# Patient Record
Sex: FEMALE | Race: WHITE | NOT HISPANIC OR LATINO | Employment: OTHER | URBAN - METROPOLITAN AREA
[De-identification: names, ages, dates, MRNs, and addresses within clinical notes are randomized per-mention and may not be internally consistent; named-entity substitution may affect disease eponyms.]

---

## 2024-10-09 ENCOUNTER — APPOINTMENT (OUTPATIENT)
Dept: RADIOLOGY | Facility: HOSPITAL | Age: 89
End: 2024-10-09
Payer: MEDICARE

## 2024-10-09 ENCOUNTER — APPOINTMENT (OUTPATIENT)
Dept: CARDIOLOGY | Facility: HOSPITAL | Age: 89
End: 2024-10-09
Payer: MEDICARE

## 2024-10-09 ENCOUNTER — HOSPITAL ENCOUNTER (INPATIENT)
Facility: HOSPITAL | Age: 89
LOS: 1 days | Discharge: HOSPICE | End: 2024-10-09
Attending: EMERGENCY MEDICINE | Admitting: STUDENT IN AN ORGANIZED HEALTH CARE EDUCATION/TRAINING PROGRAM
Payer: MEDICARE

## 2024-10-09 VITALS
DIASTOLIC BLOOD PRESSURE: 89 MMHG | TEMPERATURE: 97.8 F | SYSTOLIC BLOOD PRESSURE: 134 MMHG | RESPIRATION RATE: 21 BRPM | OXYGEN SATURATION: 94 % | HEART RATE: 109 BPM

## 2024-10-09 DIAGNOSIS — J44.1 ACUTE EXACERBATION OF CHRONIC OBSTRUCTIVE PULMONARY DISEASE (COPD) (MULTI): ICD-10-CM

## 2024-10-09 DIAGNOSIS — J18.9 COMMUNITY ACQUIRED PNEUMONIA OF RIGHT LUNG, UNSPECIFIED PART OF LUNG: Primary | ICD-10-CM

## 2024-10-09 DIAGNOSIS — E87.1 HYPONATREMIA: ICD-10-CM

## 2024-10-09 DIAGNOSIS — R65.21 SEPTIC SHOCK (MULTI): ICD-10-CM

## 2024-10-09 DIAGNOSIS — J96.01 ACUTE HYPOXIC RESPIRATORY FAILURE (MULTI): ICD-10-CM

## 2024-10-09 DIAGNOSIS — A41.9 SEPTIC SHOCK (MULTI): ICD-10-CM

## 2024-10-09 DIAGNOSIS — E87.6 HYPOKALEMIA: ICD-10-CM

## 2024-10-09 LAB
ALBUMIN SERPL BCP-MCNC: 3.7 G/DL (ref 3.4–5)
ALP SERPL-CCNC: 101 U/L (ref 33–136)
ALT SERPL W P-5'-P-CCNC: 7 U/L (ref 7–45)
ANION GAP SERPL CALCULATED.3IONS-SCNC: 17 MMOL/L (ref 10–20)
APPEARANCE UR: CLEAR
AST SERPL W P-5'-P-CCNC: 14 U/L (ref 9–39)
ATRIAL RATE: 112 BPM
BACTERIA #/AREA URNS AUTO: ABNORMAL /HPF
BASOPHILS # BLD AUTO: 0.08 X10*3/UL (ref 0–0.1)
BASOPHILS NFR BLD AUTO: 0.4 %
BILIRUB SERPL-MCNC: 0.4 MG/DL (ref 0–1.2)
BILIRUB UR STRIP.AUTO-MCNC: NEGATIVE MG/DL
BNP SERPL-MCNC: 327 PG/ML (ref 0–99)
BUN SERPL-MCNC: 13 MG/DL (ref 6–23)
CALCIUM SERPL-MCNC: 8.2 MG/DL (ref 8.6–10.3)
CARDIAC TROPONIN I PNL SERPL HS: 12 NG/L (ref 0–13)
CARDIAC TROPONIN I PNL SERPL HS: 38 NG/L (ref 0–13)
CHLORIDE SERPL-SCNC: 94 MMOL/L (ref 98–107)
CO2 SERPL-SCNC: 26 MMOL/L (ref 21–32)
COLOR UR: YELLOW
CREAT SERPL-MCNC: 0.48 MG/DL (ref 0.5–1.05)
EGFRCR SERPLBLD CKD-EPI 2021: 90 ML/MIN/1.73M*2
EOSINOPHIL # BLD AUTO: 0.31 X10*3/UL (ref 0–0.4)
EOSINOPHIL NFR BLD AUTO: 1.4 %
ERYTHROCYTE [DISTWIDTH] IN BLOOD BY AUTOMATED COUNT: 12.8 % (ref 11.5–14.5)
GLUCOSE SERPL-MCNC: 135 MG/DL (ref 74–99)
GLUCOSE UR STRIP.AUTO-MCNC: NORMAL MG/DL
GRAN CASTS #/AREA UR COMP ASSIST: ABNORMAL /LPF
HCT VFR BLD AUTO: 45.2 % (ref 36–46)
HGB BLD-MCNC: 14.3 G/DL (ref 12–16)
HYALINE CASTS #/AREA URNS AUTO: ABNORMAL /LPF
IMM GRANULOCYTES # BLD AUTO: 0.21 X10*3/UL (ref 0–0.5)
IMM GRANULOCYTES NFR BLD AUTO: 1 % (ref 0–0.9)
KETONES UR STRIP.AUTO-MCNC: NEGATIVE MG/DL
LACTATE SERPL-SCNC: 2 MMOL/L (ref 0.4–2)
LACTATE SERPL-SCNC: 4.1 MMOL/L (ref 0.4–2)
LEUKOCYTE ESTERASE UR QL STRIP.AUTO: ABNORMAL
LYMPHOCYTES # BLD AUTO: 1.84 X10*3/UL (ref 0.8–3)
LYMPHOCYTES NFR BLD AUTO: 8.6 %
MCH RBC QN AUTO: 31.2 PG (ref 26–34)
MCHC RBC AUTO-ENTMCNC: 31.6 G/DL (ref 32–36)
MCV RBC AUTO: 99 FL (ref 80–100)
MONOCYTES # BLD AUTO: 1.51 X10*3/UL (ref 0.05–0.8)
MONOCYTES NFR BLD AUTO: 7 %
MUCOUS THREADS #/AREA URNS AUTO: ABNORMAL /LPF
NEUTROPHILS # BLD AUTO: 17.47 X10*3/UL (ref 1.6–5.5)
NEUTROPHILS NFR BLD AUTO: 81.6 %
NITRITE UR QL STRIP.AUTO: NEGATIVE
NRBC BLD-RTO: 0 /100 WBCS (ref 0–0)
P AXIS: 72 DEGREES
P OFFSET: 202 MS
P ONSET: 137 MS
PH UR STRIP.AUTO: 5.5 [PH]
PLATELET # BLD AUTO: 198 X10*3/UL (ref 150–450)
POTASSIUM SERPL-SCNC: 3.2 MMOL/L (ref 3.5–5.3)
PR INTERVAL: 170 MS
PROT SERPL-MCNC: 6.4 G/DL (ref 6.4–8.2)
PROT UR STRIP.AUTO-MCNC: ABNORMAL MG/DL
Q ONSET: 222 MS
QRS COUNT: 19 BEATS
QRS DURATION: 98 MS
QT INTERVAL: 358 MS
QTC CALCULATION(BAZETT): 488 MS
QTC FREDERICIA: 440 MS
R AXIS: 57 DEGREES
RBC # BLD AUTO: 4.58 X10*6/UL (ref 4–5.2)
RBC # UR STRIP.AUTO: ABNORMAL /UL
RBC #/AREA URNS AUTO: ABNORMAL /HPF
SARS-COV-2 RNA RESP QL NAA+PROBE: NOT DETECTED
SODIUM SERPL-SCNC: 134 MMOL/L (ref 136–145)
SP GR UR STRIP.AUTO: 1.01
SQUAMOUS #/AREA URNS AUTO: ABNORMAL /HPF
T AXIS: 62 DEGREES
T OFFSET: 401 MS
UROBILINOGEN UR STRIP.AUTO-MCNC: NORMAL MG/DL
VENTRICULAR RATE: 112 BPM
WBC # BLD AUTO: 21.4 X10*3/UL (ref 4.4–11.3)
WBC #/AREA URNS AUTO: ABNORMAL /HPF

## 2024-10-09 PROCEDURE — 96361 HYDRATE IV INFUSION ADD-ON: CPT

## 2024-10-09 PROCEDURE — 99291 CRITICAL CARE FIRST HOUR: CPT | Mod: 25

## 2024-10-09 PROCEDURE — 87040 BLOOD CULTURE FOR BACTERIA: CPT | Mod: TRILAB | Performed by: EMERGENCY MEDICINE

## 2024-10-09 PROCEDURE — 70450 CT HEAD/BRAIN W/O DYE: CPT

## 2024-10-09 PROCEDURE — 81001 URINALYSIS AUTO W/SCOPE: CPT | Performed by: EMERGENCY MEDICINE

## 2024-10-09 PROCEDURE — 96375 TX/PRO/DX INJ NEW DRUG ADDON: CPT

## 2024-10-09 PROCEDURE — 93005 ELECTROCARDIOGRAM TRACING: CPT

## 2024-10-09 PROCEDURE — 83605 ASSAY OF LACTIC ACID: CPT | Performed by: EMERGENCY MEDICINE

## 2024-10-09 PROCEDURE — 96365 THER/PROPH/DIAG IV INF INIT: CPT

## 2024-10-09 PROCEDURE — P9612 CATHETERIZE FOR URINE SPEC: HCPCS

## 2024-10-09 PROCEDURE — 36415 COLL VENOUS BLD VENIPUNCTURE: CPT | Performed by: EMERGENCY MEDICINE

## 2024-10-09 PROCEDURE — 83880 ASSAY OF NATRIURETIC PEPTIDE: CPT | Performed by: EMERGENCY MEDICINE

## 2024-10-09 PROCEDURE — 71046 X-RAY EXAM CHEST 2 VIEWS: CPT

## 2024-10-09 PROCEDURE — 84484 ASSAY OF TROPONIN QUANT: CPT | Performed by: EMERGENCY MEDICINE

## 2024-10-09 PROCEDURE — 70450 CT HEAD/BRAIN W/O DYE: CPT | Performed by: RADIOLOGY

## 2024-10-09 PROCEDURE — 73090 X-RAY EXAM OF FOREARM: CPT | Mod: RT

## 2024-10-09 PROCEDURE — 72125 CT NECK SPINE W/O DYE: CPT | Performed by: RADIOLOGY

## 2024-10-09 PROCEDURE — 87635 SARS-COV-2 COVID-19 AMP PRB: CPT | Performed by: EMERGENCY MEDICINE

## 2024-10-09 PROCEDURE — 1100000001 HC PRIVATE ROOM DAILY

## 2024-10-09 PROCEDURE — 73090 X-RAY EXAM OF FOREARM: CPT | Mod: RIGHT SIDE | Performed by: RADIOLOGY

## 2024-10-09 PROCEDURE — 71046 X-RAY EXAM CHEST 2 VIEWS: CPT | Performed by: RADIOLOGY

## 2024-10-09 PROCEDURE — 2500000004 HC RX 250 GENERAL PHARMACY W/ HCPCS (ALT 636 FOR OP/ED): Performed by: EMERGENCY MEDICINE

## 2024-10-09 PROCEDURE — 85025 COMPLETE CBC W/AUTO DIFF WBC: CPT | Performed by: EMERGENCY MEDICINE

## 2024-10-09 PROCEDURE — 72125 CT NECK SPINE W/O DYE: CPT

## 2024-10-09 PROCEDURE — 87086 URINE CULTURE/COLONY COUNT: CPT | Mod: TRILAB,WESLAB | Performed by: EMERGENCY MEDICINE

## 2024-10-09 PROCEDURE — 84075 ASSAY ALKALINE PHOSPHATASE: CPT | Performed by: EMERGENCY MEDICINE

## 2024-10-09 RX ORDER — POTASSIUM CHLORIDE 1.5 G/1.58G
40 POWDER, FOR SOLUTION ORAL
Status: DISCONTINUED | OUTPATIENT
Start: 2024-10-09 | End: 2024-10-09

## 2024-10-09 RX ORDER — AMOXICILLIN AND CLAVULANATE POTASSIUM 875; 125 MG/1; MG/1
1 TABLET, FILM COATED ORAL 2 TIMES DAILY
Start: 2024-10-09 | End: 2024-10-16

## 2024-10-09 RX ORDER — GUAIFENESIN 600 MG/1
600 TABLET, EXTENDED RELEASE ORAL 2 TIMES DAILY PRN
Status: DISCONTINUED | OUTPATIENT
Start: 2024-10-09 | End: 2024-10-09 | Stop reason: HOSPADM

## 2024-10-09 RX ORDER — CEFTRIAXONE 2 G/50ML
2 INJECTION, SOLUTION INTRAVENOUS ONCE
Status: COMPLETED | OUTPATIENT
Start: 2024-10-09 | End: 2024-10-09

## 2024-10-09 RX ORDER — IPRATROPIUM BROMIDE AND ALBUTEROL SULFATE 2.5; .5 MG/3ML; MG/3ML
3 SOLUTION RESPIRATORY (INHALATION) EVERY 6 HOURS PRN
Status: DISCONTINUED | OUTPATIENT
Start: 2024-10-09 | End: 2024-10-09 | Stop reason: HOSPADM

## 2024-10-09 RX ORDER — POTASSIUM CHLORIDE 14.9 MG/ML
20 INJECTION INTRAVENOUS ONCE
Status: COMPLETED | OUTPATIENT
Start: 2024-10-09 | End: 2024-10-09

## 2024-10-09 RX ORDER — GLYCOPYRROLATE 1 MG/1
1 TABLET ORAL 3 TIMES DAILY
Status: DISCONTINUED | OUTPATIENT
Start: 2024-10-09 | End: 2024-10-09 | Stop reason: HOSPADM

## 2024-10-09 RX ADMIN — SODIUM CHLORIDE 250 ML: 900 INJECTION, SOLUTION INTRAVENOUS at 10:05

## 2024-10-09 RX ADMIN — SODIUM CHLORIDE 250 ML: 9 INJECTION, SOLUTION INTRAVENOUS at 11:29

## 2024-10-09 RX ADMIN — POTASSIUM CHLORIDE 20 MEQ: 14.9 INJECTION, SOLUTION INTRAVENOUS at 11:10

## 2024-10-09 RX ADMIN — SODIUM CHLORIDE 1000 ML: 9 INJECTION, SOLUTION INTRAVENOUS at 11:28

## 2024-10-09 RX ADMIN — DEXTROSE MONOHYDRATE 500 MG: 50 INJECTION, SOLUTION INTRAVENOUS at 10:05

## 2024-10-09 RX ADMIN — CEFTRIAXONE SODIUM 2 G: 2 INJECTION, SOLUTION INTRAVENOUS at 10:06

## 2024-10-09 ASSESSMENT — ENCOUNTER SYMPTOMS
DIAPHORESIS: 0
PALPITATIONS: 0
MUSCULOSKELETAL NEGATIVE: 1
ABDOMINAL DISTENTION: 1
HEADACHES: 0
FEVER: 0
DIARRHEA: 0
STRIDOR: 0
CHILLS: 0
UNEXPECTED WEIGHT CHANGE: 0
LIGHT-HEADEDNESS: 0
DIZZINESS: 0
VOMITING: 0
CONFUSION: 1
WOUND: 1
CONSTIPATION: 0
WHEEZING: 0
FATIGUE: 1
SHORTNESS OF BREATH: 0
APPETITE CHANGE: 0
COUGH: 1
NAUSEA: 0
ABDOMINAL PAIN: 0

## 2024-10-09 ASSESSMENT — PAIN DESCRIPTION - LOCATION: LOCATION: OTHER (COMMENT)

## 2024-10-09 ASSESSMENT — COLUMBIA-SUICIDE SEVERITY RATING SCALE - C-SSRS
6. HAVE YOU EVER DONE ANYTHING, STARTED TO DO ANYTHING, OR PREPARED TO DO ANYTHING TO END YOUR LIFE?: NO
1. IN THE PAST MONTH, HAVE YOU WISHED YOU WERE DEAD OR WISHED YOU COULD GO TO SLEEP AND NOT WAKE UP?: NO
2. HAVE YOU ACTUALLY HAD ANY THOUGHTS OF KILLING YOURSELF?: NO

## 2024-10-09 ASSESSMENT — PAIN DESCRIPTION - PAIN TYPE: TYPE: ACUTE PAIN

## 2024-10-09 ASSESSMENT — PAIN - FUNCTIONAL ASSESSMENT: PAIN_FUNCTIONAL_ASSESSMENT: 0-10

## 2024-10-09 ASSESSMENT — PAIN SCALES - GENERAL: PAINLEVEL_OUTOF10: 2

## 2024-10-09 NOTE — NURSING NOTE
RN Hospice Note    Chanda aFye is a Hospice Patient.   Hospice terminal diagnosis: copd   Physician:Sherman pacheco   Visit type: discharge     Comments/recommendations: met with pt, dtr milind and called spouse chelsy on the phone. On arrival to room dtr stated she wanted to keep pt on hospice but was not sure keeping pt in hospital was the best choice for her mother as she was becoming more confused.  We reviewed alternate option of going to [facility] for symptom control for her SOB , change in mental status . She and pts spouse both aware that hospice will ONLY give ORAL antibiotics and will NOT give IV atb. Both in agreement to go to [facility] and agree to oral atb. Spouse stated dtr can sign forms to go . Matteo placed as pt yelling out she needed to void but was unable to go.  Chart faxed. Report called.  [facility] set for 5pm. Please keep ayala and heplock.     Discharge Planning:  Patient to be discharged to  hospice house    The following is to be completed:  Discharge order: done  State DNR signed by MD: done  Nursing facility referral/transfer form: na  Medication reconciliation: done  PAS/RR or convalescent stay form: na  Prescriptions for al narcotics/new medications: na  Transportation: [facility] 5pm   Other: na    Plan of care reviewed with patient/family members chelsy faye and milind telling    Plan of care reviewed with hospital staff members: maliha medellin and      Please notify [facility] of any changes in condition. Thank you.  Office: [phone] (8 am-6:30 pm M-F and 8 am-4:30 pm weekends and holidays)   [phone] (6:30 pm-8 am M-F and 4:30 pm-8 am weekends and holidays)    Jil Santo RN

## 2024-10-09 NOTE — H&P
History Of Present Illness  Chanda Alarcon is a 89 y.o. female presenting with a recent fall and pneumonia.     The patient is a 89yo with a history of COPD on 2-3L of oxygen at home, admitting from the ED due to a recent unwitnessed fall. She was subsequently found to have pneumonia in the ED of the left upper lobe. Due to her COPD, she is in hospice care. It's estimated that 45min-1hr had passed between her  last seeing her and her son finding her. She's had a bad cough per her son recently. EMS had found her at 86% on room air, as she wasn't wearing her oxygen at the time of the fall.   The hospice team was in the room with her. They said she is usually oriented A&Ox3 with some dementia, but it now confused, abnormal from her baseline. She was responsive to my questions but was disoriented, confusing who was speaking to her.  She reports pain only at her IV site, but it's functioning well, confirmed by nursing.    Past Medical History  She has no past medical history on file.  Problem List as of 05/2023:  Centrilobular emphysema  Cardiac arrhythmia, unspecified  Nicotine Dependence  Supplemental Oxygen  Inhaled corticosteroids  History of Falls  History of pneumonia, unspecified organism  Essential Hypertension  Hypercholesterolemia  Anxiety Disorder, unspecified  Prediabetes  Major Depressive Disorder  Essential Tremor  Acute Respiratory Failure  Medication List as of 05/2023:  Advair 115mcg/21mcg, inhaler once daily  Amlodipine 10mg tablet, once daily  Home Oxygen, 3L  Primatene mist 0.125MG/Actuation HFA, 1puff q6hr PRN for SOB  Simvastatin 20mg tablet, once daily  Spironolactone, 25mg tablet, once  daily      Surgical History  She has no past surgical history on file.     Social History  She has no history on file for tobacco use, alcohol use, and drug use.    Family History  No family history on file.     Allergies  Patient has no known allergies.    Review of Systems   Constitutional:  Positive for  fatigue. Negative for appetite change, chills, diaphoresis, fever and unexpected weight change.   HENT: Negative.     Respiratory:  Positive for cough. Negative for shortness of breath, wheezing and stridor.    Cardiovascular:  Negative for chest pain, palpitations and leg swelling.   Gastrointestinal:  Positive for abdominal distention. Negative for abdominal pain, constipation, diarrhea, nausea and vomiting.   Genitourinary: Negative.    Musculoskeletal: Negative.    Skin:  Positive for wound.   Neurological:  Negative for dizziness, light-headedness and headaches.   Psychiatric/Behavioral:  Positive for confusion.    ROS is complicated by current mental status     Physical Exam  Constitutional:       Appearance: She is ill-appearing.   HENT:      Head: Abrasion present.     Cardiovascular:      Rate and Rhythm: Normal rate and regular rhythm.      Heart sounds: Normal heart sounds.   Pulmonary:      Effort: Pulmonary effort is normal.      Breath sounds: Rhonchi present.   Abdominal:      General: There is distension.      Palpations: Abdomen is soft.      Tenderness: There is no abdominal tenderness.   Musculoskeletal:        Arms:       Right lower le+ Edema present.      Left lower le+ Edema present.      Comments: Extensive bruising right head and right arm.  Pain at the sites of bruising.  No pain or tenderness on the left side or either leg on palpation.   Skin:     General: Skin is warm and dry.   Neurological:      Mental Status: She is alert. She is disoriented and confused.      Cranial Nerves: Cranial nerves 2-12 are intact.      Sensory: Sensation is intact.      Motor: Weakness present.      Comments: Not baseline          Last Recorded Vitals  /67   Pulse (!) 111   Temp 36.6 °C (97.8 °F) (Oral)   Resp (!) 21   SpO2 95%     Relevant Results     CT head wo IV contrast    Result Date: 10/9/2024  Interpreted By:  Claudia Kauffman and Sullivan Shannon STUDY: CT HEAD WO IV CONTRAST; CT  CERVICAL SPINE WO IV CONTRAST;  10/9/2024 10:50 am; 10/9/2024 10:51 am   INDICATION: Signs/Symptoms:fall.   COMPARISON: Noncontrast CT head 05/21/2023, CT cervical spine 05/19/2023.   ACCESSION NUMBER(S): [id number]; [id number]   ORDERING CLINICIAN: AMINA MCKEON   TECHNIQUE:     Noncontrast axial CT scan of head was performed. Angled reformats in brain and bone windows were generated. The images were reviewed in bone, brain, blood and soft tissue windows.   Axial CT images of the cervical spine are obtained. Axial, coronal and sagittal reconstructions are provided for review.   FINDINGS: Noncontrast CT head:   Confluent subcortical and periventricular white matter hypodensities are again noted, which are nonspecific but favored to represent sequelae of small vessel ischemic changes.   No acute cortical infarct or intracranial hemorrhage. No intraventricular hemorrhage. No mass effect or midline shift. No extra-axial fluid collection. The ventricles, sulci and basal cisterns are appropriate for the degree of volume loss.     No calvarial fracture.   Mild mucosal thickening of the ethmoid air cells. The remainder of the visualized paranasal sinuses are clear. The bilateral mastoid air cells are clear.   CT cervical spine:   There 7 cervical type vertebrae, with numeration in keeping with prior exam.   Alignment:  The atlantooccipital and atlantoaxial intervals are preserved. Similar mild straightening of the normal cervical lordosis. Similar degree of grade 1 anterolisthesis of C4 on C5 and C3 on C4. No evidence of facet subluxation.   Vertebrae/Intervertebral Discs: Similar-appearing mild anterior wedging of the C5 superior endplate. The remainder of the vertebral bodies demonstrate expected height. Multilevel degenerative changes of the cervical spine, including osteophytes and facet hypertrophy. Similar degree of intervertebral disc space height loss at C5-6 and C6-7. No significant bony encroachment into  the spinal canal.   No fracture of the cervical spine.   The prevertebral and posterior paraspinous soft tissues are within normal limits.   Limited evaluation of the pulmonary apices demonstrates a few pulmonary nodules measuring up to 3 mm as annotated on series 3 and similar to prior CT chest 05/19/2023. Biapical hyperinflation and scarring. Limited evaluation of the cervical neck soft tissues demonstrate no focal soft tissue mass lesion or suspicious lymphadenopathy.       1. No acute intracranial abnormality. No evidence of calvarial fracture. 2. No evidence for an acute fracture or traumatic subluxation of the cervical spine. 3. A few bilateral upper lobe pulmonary nodules measuring up to 3 mm, similar to prior CT chest. No further follow-up is required, however, if the patient has high risk factors for primary lung malignancy, follow-up noncontrast CT scan chest in 12 months may be obtained. (Hollis Gallegos et al., Guidelines for management of incidental pulmonary nodules detected on CT images: From the Fleischner Society 2017, Radiology. 2017 Jul;284 (1):228-243.)     I personally reviewed the images/study and I agree with the findings as stated by Radiology resident Dr. Holguin.   MACRO: None   Signed by: Claudia Kauffman 10/9/2024 11:23 AM Dictation workstation:   [id number]    CT cervical spine wo IV contrast    Result Date: 10/9/2024  Interpreted By:  Claudia Kauffman and Sullivan Frazee STUDY: CT HEAD WO IV CONTRAST; CT CERVICAL SPINE WO IV CONTRAST;  10/9/2024 10:50 am; 10/9/2024 10:51 am   INDICATION: Signs/Symptoms:fall.   COMPARISON: Noncontrast CT head 05/21/2023, CT cervical spine 05/19/2023.   ACCESSION NUMBER(S): [id number]; [id number]   ORDERING CLINICIAN: AMINA MCKEON   TECHNIQUE:     Noncontrast axial CT scan of head was performed. Angled reformats in brain and bone windows were generated. The images were reviewed in bone, brain, blood and soft tissue windows.   Axial CT images of the  cervical spine are obtained. Axial, coronal and sagittal reconstructions are provided for review.   FINDINGS: Noncontrast CT head:   Confluent subcortical and periventricular white matter hypodensities are again noted, which are nonspecific but favored to represent sequelae of small vessel ischemic changes.   No acute cortical infarct or intracranial hemorrhage. No intraventricular hemorrhage. No mass effect or midline shift. No extra-axial fluid collection. The ventricles, sulci and basal cisterns are appropriate for the degree of volume loss.     No calvarial fracture.   Mild mucosal thickening of the ethmoid air cells. The remainder of the visualized paranasal sinuses are clear. The bilateral mastoid air cells are clear.   CT cervical spine:   There 7 cervical type vertebrae, with numeration in keeping with prior exam.   Alignment:  The atlantooccipital and atlantoaxial intervals are preserved. Similar mild straightening of the normal cervical lordosis. Similar degree of grade 1 anterolisthesis of C4 on C5 and C3 on C4. No evidence of facet subluxation.   Vertebrae/Intervertebral Discs: Similar-appearing mild anterior wedging of the C5 superior endplate. The remainder of the vertebral bodies demonstrate expected height. Multilevel degenerative changes of the cervical spine, including osteophytes and facet hypertrophy. Similar degree of intervertebral disc space height loss at C5-6 and C6-7. No significant bony encroachment into the spinal canal.   No fracture of the cervical spine.   The prevertebral and posterior paraspinous soft tissues are within normal limits.   Limited evaluation of the pulmonary apices demonstrates a few pulmonary nodules measuring up to 3 mm as annotated on series 3 and similar to prior CT chest 05/19/2023. Biapical hyperinflation and scarring. Limited evaluation of the cervical neck soft tissues demonstrate no focal soft tissue mass lesion or suspicious lymphadenopathy.       1. No acute  intracranial abnormality. No evidence of calvarial fracture. 2. No evidence for an acute fracture or traumatic subluxation of the cervical spine. 3. A few bilateral upper lobe pulmonary nodules measuring up to 3 mm, similar to prior CT chest. No further follow-up is required, however, if the patient has high risk factors for primary lung malignancy, follow-up noncontrast CT scan chest in 12 months may be obtained. (Hollis Gallegos et al., Guidelines for management of incidental pulmonary nodules detected on CT images: From the Fleischner Society 2017, Radiology. 2017 Jul;284 (1):228-243.)     I personally reviewed the images/study and I agree with the findings as stated by Radiology resident Dr. Holguin.   MACRO: None   Signed by: Claudia Kauffman 10/9/2024 11:23 AM Dictation workstation:   [id number]    XR forearm right 2 views    Result Date: 10/9/2024  Interpreted By:  Primo Wells, STUDY: XR FOREARM RIGHT 2 VIEWS;  10/9/2024 10:34 am   INDICATION: Signs/Symptoms:fall.   COMPARISON: None.   ACCESSION NUMBER(S): [id number]   ORDERING CLINICIAN: AMINA MCKEON   TECHNIQUE: AP and lateral views  of the  right forearm were obtained.   FINDINGS: Osteopenic bones. Incidental peripheral venous cannula in the antecubital fossa. No significant osteophytic change. No lytic or blastic destructive bone lesion. No acute fracture or dislocation. No opaque soft tissue foreign body. No periosteal reaction or erosion.       Osteopenia.   No acute fracture or dislocation based on this exam.   MACRO: None   Signed by: Primo Wells 10/9/2024 10:43 AM Dictation workstation:   [id number]    Electrocardiogram, 12-lead ACS symptoms    Result Date: 10/9/2024  Poor data quality, interpretation may be adversely affected Sinus tachycardia Inferior infarct , age undetermined Abnormal ECG No previous ECGs available    XR chest 2 views    Result Date: 10/9/2024  Interpreted By:  Primo Wells, STUDY: XR CHEST 2 VIEWS;  10/9/2024 10:34 am    INDICATION: Signs/Symptoms:hypoxia, fall.   COMPARISON: CT scan from 05/19/2023. Chest x-rays, most recent is from 05/21/2023.   ACCESSION NUMBER(S): [id number]   ORDERING CLINICIAN: AMINA MCKEON   TECHNIQUE: PA and lateral views of the chest were obtained.   FINDINGS: MEDIASTINUM/LUNGS/JERSON: Underlying emphysema, most pronounced in the left lower lobe. Calcification throughout the thoracic aorta. Mild cardiomegaly without vascular congestion or pleural effusion. There is diffuse infiltrative haziness throughout the right upper lobe. There is mild partially calcified biapical pleural scarring. No pneumothorax. No tracheal deviation. No abnormal hilar fullness or gross mass on either side.   BONES: No lytic or blastic destructive bone lesion.   UPPER ABDOMEN: Grossly intact.       Findings consistent with diffuse right upper lobe pneumonia.   Underlying emphysema, most pronounced in the left lower lobe.   Cardiomegaly.   Diffuse calcification throughout the thoracic aorta.   MACRO: None   Signed by: Primo Wells 10/9/2024 10:41 AM Dictation workstation:   [id number]      Results for orders placed or performed during the hospital encounter of 10/09/24 (from the past 24 hour(s))   CBC and Auto Differential   Result Value Ref Range    WBC 21.4 (H) 4.4 - 11.3 x10*3/uL    nRBC 0.0 0.0 - 0.0 /100 WBCs    RBC 4.58 4.00 - 5.20 x10*6/uL    Hemoglobin 14.3 12.0 - 16.0 g/dL    Hematocrit 45.2 36.0 - 46.0 %    MCV 99 80 - 100 fL    MCH 31.2 26.0 - 34.0 pg    MCHC 31.6 (L) 32.0 - 36.0 g/dL    RDW 12.8 11.5 - 14.5 %    Platelets 198 150 - 450 x10*3/uL    Neutrophils % 81.6 40.0 - 80.0 %    Immature Granulocytes %, Automated 1.0 (H) 0.0 - 0.9 %    Lymphocytes % 8.6 13.0 - 44.0 %    Monocytes % 7.0 2.0 - 10.0 %    Eosinophils % 1.4 0.0 - 6.0 %    Basophils % 0.4 0.0 - 2.0 %    Neutrophils Absolute 17.47 (H) 1.60 - 5.50 x10*3/uL    Immature Granulocytes Absolute, Automated 0.21 0.00 - 0.50 x10*3/uL    Lymphocytes Absolute  1.84 0.80 - 3.00 x10*3/uL    Monocytes Absolute 1.51 (H) 0.05 - 0.80 x10*3/uL    Eosinophils Absolute 0.31 0.00 - 0.40 x10*3/uL    Basophils Absolute 0.08 0.00 - 0.10 x10*3/uL   Comprehensive Metabolic Panel   Result Value Ref Range    Glucose 135 (H) 74 - 99 mg/dL    Sodium 134 (L) 136 - 145 mmol/L    Potassium 3.2 (L) 3.5 - 5.3 mmol/L    Chloride 94 (L) 98 - 107 mmol/L    Bicarbonate 26 21 - 32 mmol/L    Anion Gap 17 10 - 20 mmol/L    Urea Nitrogen 13 6 - 23 mg/dL    Creatinine 0.48 (L) 0.50 - 1.05 mg/dL    eGFR 90 >60 mL/min/1.73m*2    Calcium 8.2 (L) 8.6 - 10.3 mg/dL    Albumin 3.7 3.4 - 5.0 g/dL    Alkaline Phosphatase 101 33 - 136 U/L    Total Protein 6.4 6.4 - 8.2 g/dL    AST 14 9 - 39 U/L    Bilirubin, Total 0.4 0.0 - 1.2 mg/dL    ALT 7 7 - 45 U/L   Lactate   Result Value Ref Range    Lactate 4.1 (HH) 0.4 - 2.0 mmol/L   B-type natriuretic peptide   Result Value Ref Range     (H) 0 - 99 pg/mL   Troponin I, High Sensitivity, Initial   Result Value Ref Range    Troponin I, High Sensitivity 12 0 - 13 ng/L   Electrocardiogram, 12-lead ACS symptoms   Result Value Ref Range    Ventricular Rate 112 BPM    Atrial Rate 112 BPM    TX Interval 170 ms    QRS Duration 98 ms    QT Interval 358 ms    QTC Calculation(Bazett) 488 ms    P Axis 72 degrees    R Axis 57 degrees    T Axis 62 degrees    QRS Count 19 beats    Q Onset 222 ms    P Onset 137 ms    P Offset 202 ms    T Offset 401 ms    QTC Fredericia 440 ms   Urinalysis with Reflex Culture and Microscopic   Result Value Ref Range    Color, Urine Yellow Light-Yellow, Yellow, Dark-Yellow    Appearance, Urine Clear Clear    Specific Gravity, Urine 1.014 1.005 - 1.035    pH, Urine 5.5 5.0, 5.5, 6.0, 6.5, 7.0, 7.5, 8.0    Protein, Urine 50 (1+) (A) NEGATIVE, 10 (TRACE), 20 (TRACE) mg/dL    Glucose, Urine Normal Normal mg/dL    Blood, Urine 0.03 (TRACE) (A) NEGATIVE    Ketones, Urine NEGATIVE NEGATIVE mg/dL    Bilirubin, Urine NEGATIVE NEGATIVE    Urobilinogen, Urine  Normal Normal mg/dL    Nitrite, Urine NEGATIVE NEGATIVE    Leukocyte Esterase, Urine 25 Saida/µL (A) NEGATIVE   Microscopic Only, Urine   Result Value Ref Range    WBC, Urine 1-5 1-5, NONE /HPF    RBC, Urine 1-2 NONE, 1-2, 3-5 /HPF    Squamous Epithelial Cells, Urine 1-9 (SPARSE) Reference range not established. /HPF    Bacteria, Urine 1+ (A) NONE SEEN /HPF    Mucus, Urine FEW Reference range not established. /LPF    Hyaline Casts, Urine 1+ (A) NONE /LPF    Fine Granular Casts, Urine 1+ (A) NONE /LPF   Troponin, High Sensitivity, 1 Hour   Result Value Ref Range    Troponin I, High Sensitivity 38 (H) 0 - 13 ng/L   Sars-CoV-2 PCR   Result Value Ref Range    Coronavirus 2019, PCR Not Detected Not Detected   Lactate   Result Value Ref Range    Lactate 2.0 0.4 - 2.0 mmol/L         Assessment/Plan   Assessment & Plan  Community acquired pneumonia of right lung, unspecified part of lung  - Meets sepsis criteria for leukocytosis, tachycardia, tachypnea, AMS, elevated lactate (cleared with fluid).  - CXR showed diffuse right upper lobe pneumonia and emphysema   - Tx with ceftriaxone and azithromycin. Waiting for blood and urine cultures. Urine does show bacteria and hyaline/granular casts.  - Add urine test for Strep and Legionella.  - Delirium precautions: For night time, add melatonin and small dose ativan for agitation.  Acute hypoxic respiratory failure (Multi)  Continue oxygen and at home medications  Hypokalemia  Given in the ED. Recheck.  Acute exacerbation of chronic obstructive pulmonary disease (COPD) (Multi)  As above, continue oxygen and home meds.    Fall  Some contusion on the right side of her head, right upper arm, and sacrum. Defer to hospice pain management plan.    Discharge Planning  Discharge today to inpatient hospice to finish pneumonia treatment.         ELENA HOROWITZ

## 2024-10-09 NOTE — ED PROVIDER NOTES
HPI   Chief Complaint   Patient presents with    Fall     Unwitnessed fall at home. Per ems pt was found face down against a dresser in bedroom        89-year-old female with history of COPD on chronic 2-3 L nasal cannula on home hospice DNR comfort care presents for evaluation of fall was unwitnessed.   left the house to go to [employer] she was in bed and son then found her on the ground near a dresser.  She has had a significant cough recently per son upon his arrival to the emergency department.  EMS reported patient was hypoxic to 86% on room air placed on nonrebreather mask and she did have coarse wet breath sounds.  Patient baseline oriented 1-2 per EMS report.      History provided by:  Patient, medical records and caregiver  History limited by:  Dementia   used: No            Patient History   History reviewed. No pertinent past medical history.  History reviewed. No pertinent surgical history.  No family history on file.  Social History     Tobacco Use    Smoking status: Not on file    Smokeless tobacco: Not on file   Substance Use Topics    Alcohol use: Not on file    Drug use: Not on file       Physical Exam   ED Triage Vitals [10/09/24 0945]   Temperature Heart Rate Respirations BP   36.6 °C (97.8 °F) (!) 111 (!) 22 121/72      Pulse Ox Temp Source Heart Rate Source Patient Position   94 % Oral Monitor --      BP Location FiO2 (%)     -- --       Physical Exam  Vitals and nursing note reviewed.     General: Vitals reviewed. Awake, alert, well-developed, well-nourished, chronically ill-appearing  HEENT: NC, ecchymosis right forehead, no bony step-offs or deformities PERRL, MMM,  no bleeding per nares, septum midline   Neck: Supple, trachea midline, no midline C-spine tenderness , step-offs , or deformities, no expanding, c-collar applied, hematomas, no stridor   Respiratory: Mildly tachypneic with coarse, wet breath sounds particularly throughout the right lung field left lung more  clear, no wheezes, no accessory muscle usage, hypoxic to 86% on room air placed on 3 L nasal cannula  CV: Tachycardic rate and regular rhythm, no murmur/gallop/rubs  Abdomen/GI: Soft, non-tender, non-distended, no rebound, guarding, or rigidity, normal bowel sounds  Extremities/MSK : Moving all extremities, no deformities, no bony tenderness to palpation in the extremities and range of motion of all joints within normal limits with a large amount of bruising on the right forearm, no midline TLS tenderness, step-offs , or deformities   Neuro: A/O x2, GCS 14 just her baseline, cranial nerves intact, no new focal motor or sensory deficits follows commands and moves all extremities appropriately  Skin: Warm, dry. No rashes identified      ED Course & MDM   ED Course as of 10/09/24 1422   Wed Oct 09, 2024   0949 EKG on my independent interpretation: Sinus tachycardia 112 bpm, normal axis, normal intervals, somewhat poor quality baseline nonspecific ST/T wave abnormality, no prior EKG for comparison [TARUN]   1041 Sepsis identified with CXR result of pneumonia [TARUN]   1130 Spoke in detail with the son and hospice nurse at bedside, they would like to maintain hospice and not escalate care although are agreeable to antibiotics for treatment of the pneumonia, prefer not to stay in hospital despite level of illness as would not agree to intubation, vasopressors or other escalation of care if condition worsens.  Hospice nurse evaluating for possible ability to perform antibiotics at hospice house which son who is her power of  has discussed with his sister and this is their preference versus hospitalization.  Consulted care coordinator for assistance as well. [TARUN]   1317 I performed a sepsis reperfusion exam on Chanda Alarcon on 10/9/2024 1:17 PM    Sue Moore MD   [TARUN]      ED Course User Index  [TARUN] Sue Moore MD         Diagnoses as of 10/09/24 1422   Acute hypoxic respiratory failure (Multi)    Community acquired pneumonia of right lung, unspecified part of lung   Hypokalemia   Acute exacerbation of chronic obstructive pulmonary disease (COPD) (Multi)   Septic shock (Multi)   Hyponatremia                 No data recorded     Best Coma Scale Score: 14 (10/09/24 1002 : María Manning RN)                           Medical Decision Making  89-year-old female presents for unwitnessed fall found down on the ground next to a dresser with bruising on her face.  She does have chronic COPD on baseline 2-3 L found to be hypoxic to 86% placed on 3 L nasal cannula with improvement.  She does have coarse breath sounds particularly on the right concerning for pneumonia or CHF.  Patient is hypoxic, tachycardic and mildly tachypneic therefore septic workup ensued and patient started on IV antibiotics.  She was given IV fluid bolus initially of 250 mL normal saline due to concern for possible fluid overload as cause of hypoxia and abnormal lung sounds pending workup.  Labs notable for leukocytosis of 21.4.  Hypokalemia 3.2 started on supplementation, mild hyponatremia 134.  Lactate was elevated at 4.1, repeat 2.0 after fluid resuscitation does meet criteria for septic shock given this and identified pneumonia on my independent interpretation of chest x-ray.  Imaging for fall/trauma including CT brain, cervical spine without acute traumatic injury as well as x-ray right forearm with no acute bony traumatic injury.  Patient is currently DNR comfort care on hospice however family would like to pursue IV antibiotics per medical recommendation to see if she will improve.  Unfortunately no bed available at hospice house in order to perform IV antibiotics therefore will admit to medicine following consultation with hospice caregiver/ as well as care coordinator, Karly Dominguez for further management.    CRITICAL CARE NOTE:    Upon my evaluation, this patient had a high probability of imminent or life-threatening  deterioration due to septic shock, which required my direct attention, intervention, and personal management    39 total minutes of critical care were personally provided which excludes and was non concurrent with other providers and all other billable procedures.  This was for time at the bedside, re-evaluations, interpretation of lab and imaging results, discussions with consultants, and monitoring for potential decompensation.  Intervention were performed as documented above.    Amount and/or Complexity of Data Reviewed  External Data Reviewed: notes.     Details: 5/24/23 palliative medicine inpatient note reviewed  Labs: ordered. Decision-making details documented in ED Course.  Radiology: ordered and independent interpretation performed. Decision-making details documented in ED Course.  ECG/medicine tests: ordered and independent interpretation performed. Decision-making details documented in ED Course.  Discussion of management or test interpretation with external provider(s): Hospitalist        Procedure  Procedures     Sue Moore MD  10/09/24 7865

## 2024-10-09 NOTE — NURSING NOTE
"RN Hospice Note    Chanda Alarcon is a Hospice Patient.   Hospice terminal diagnosis: ***  Physician: ***  Visit type: ***    Comments/recommendations: ***    Discharge Planning:  Patient to be discharged to {Discharge Destination:95662::\"home\"}    The following is to be completed:  Discharge order: ***  State DNR signed by MD: ***  Nursing facility referral/transfer form: ***  Medication reconciliation: ***  PAS/RR or convalescent stay form: ***  Prescriptions for al narcotics/new medications: ***  Transportation: ***  Other: ***    Plan of care reviewed with patient/family members ***   Plan of care reviewed with hospital staff members: ***     Please notify [facility] of any changes in condition. Thank you.  Office: [phone] (8 am-6:30 pm M-F and 8 am-4:30 pm weekends and holidays)   [phone] (6:30 pm-8 am M-F and 4:30 pm-8 am weekends and holidays)    Jil Santo RN      "

## 2024-10-09 NOTE — CARE PLAN
Received a secure chat from [facility] ED.   Pt is a DNRCC and pt of [facility]  Pt had a fall at home, family called 9-1-1; she is normally on 2-3 L of 02 at home; her SPO2 was low. Dx with pneumonia.  Family would like [facility]. Pt will be GIP until [facility] has a bed available.    DISCHARGE PLAN: HOSPICE HOUSE.

## 2024-10-09 NOTE — DISCHARGE SUMMARY
Discharge Diagnosis  Acute hypoxic respiratory failure (Multi)    Issues Requiring Follow-Up  None    Discharge Meds     Medication List      START taking these medications     amoxicillin-pot clavulanate 875-125 mg tablet; Commonly known as:   Augmentin; Take 1 tablet by mouth 2 times a day for 7 days.       Test Results Pending At Discharge  Pending Labs       Order Current Status    Extra Urine Gray Tube Collected (10/09/24 2432)    Blood Culture In process    Blood Culture In process    Urinalysis with Reflex Culture and Microscopic In process    Urine Culture In process            Hospital Course   Patient was admitted briefly due to pneumonia and per ER physician for IV antibiotics. Patient's family report thinking it was required for her to be admitted and treated with IV antibiotics. Lactic cleared in ER. Potassium was given in ER. Shortly after Medical student and my exam, she was accepted for inpatient hospice. Discharge to inpatient hospice was requested. Discharging with recommendation for 7 days of Augmentin.    Pertinent Physical Exam At Time of Discharge      11/17/2021    12:00 AM 9/22/2022    12:00 AM 10/9/2024     9:45 AM 10/9/2024    11:00 AM 10/9/2024    11:30 AM 10/9/2024    12:30 PM 10/9/2024     1:00 PM   Vitals   Systolic 140  121 144 122 137 134   Diastolic 80  72 80 87 67 89   Heart Rate 108 104 111 118 113 111 109   Temp 37.1 °C (98.8 °F) 37 °C (98.6 °F) 36.6 °C (97.8 °F)       Resp   22 19 20 21 21   Height (in) 1.524 m (5') 1.524 m (5')        Weight (lb) 111 118        BMI 21.68 kg/m2 23.05 kg/m2        BSA (m2) 1.46 m2 1.5 m2          Vitals Reviewed: yes  Constitutional: Confused, disoriented, pulling at IV  ENT: Tachy mucus membranes, airway patent  Pulm: Decreased breath sounds with rhonchi  Cardiac: tachycardic rate, regular rhythm, no murmur  GI: non-tender, distended, reduced bowel sounds  : No aylaa, no CVA tenderness  MSK: trace bilateral lower extremity edema  Skin: Warm and  well perfused  Neuro: Confused, disoriented  Psych: Altered, re-directable      Outpatient Follow-Up  No future appointments.      Shabana Reid MD

## 2024-10-10 LAB
BACTERIA UR CULT: NORMAL
HOLD SPECIMEN: NORMAL

## 2024-10-13 LAB
BACTERIA BLD CULT: NORMAL
BACTERIA BLD CULT: NORMAL

## 2024-10-14 LAB
ATRIAL RATE: 112 BPM
P AXIS: 72 DEGREES
P OFFSET: 202 MS
P ONSET: 137 MS
PR INTERVAL: 170 MS
Q ONSET: 222 MS
QRS COUNT: 19 BEATS
QRS DURATION: 98 MS
QT INTERVAL: 358 MS
QTC CALCULATION(BAZETT): 488 MS
QTC FREDERICIA: 440 MS
R AXIS: 57 DEGREES
T AXIS: 62 DEGREES
T OFFSET: 401 MS
VENTRICULAR RATE: 112 BPM